# Patient Record
Sex: MALE | Race: WHITE | NOT HISPANIC OR LATINO | Employment: FULL TIME | URBAN - METROPOLITAN AREA
[De-identification: names, ages, dates, MRNs, and addresses within clinical notes are randomized per-mention and may not be internally consistent; named-entity substitution may affect disease eponyms.]

---

## 2018-11-17 PROBLEM — S52.531A CLOSED COLLES' FRACTURE OF RIGHT RADIUS: Status: ACTIVE | Noted: 2018-11-17

## 2020-03-11 ENCOUNTER — HOSPITAL ENCOUNTER (OUTPATIENT)
Dept: RADIOLOGY | Facility: HOSPITAL | Age: 55
Discharge: HOME | End: 2020-03-11
Attending: NURSE PRACTITIONER
Payer: COMMERCIAL

## 2020-03-11 ENCOUNTER — OFFICE VISIT (OUTPATIENT)
Dept: OTOLARYNGOLOGY | Facility: CLINIC | Age: 55
End: 2020-03-11
Payer: COMMERCIAL

## 2020-03-11 ENCOUNTER — TELEPHONE (OUTPATIENT)
Dept: OTOLARYNGOLOGY | Facility: CLINIC | Age: 55
End: 2020-03-11

## 2020-03-11 ENCOUNTER — CLINICAL SUPPORT (OUTPATIENT)
Dept: AUDIOLOGY | Facility: CLINIC | Age: 55
End: 2020-03-11
Payer: COMMERCIAL

## 2020-03-11 VITALS — HEIGHT: 76 IN | WEIGHT: 187.63 LBS | BODY MASS INDEX: 22.85 KG/M2

## 2020-03-11 DIAGNOSIS — H92.01 REFERRED OTALGIA OF RIGHT EAR: ICD-10-CM

## 2020-03-11 DIAGNOSIS — R09.81 NASAL CONGESTION: ICD-10-CM

## 2020-03-11 DIAGNOSIS — J34.89 SINUS PRESSURE: Primary | ICD-10-CM

## 2020-03-11 DIAGNOSIS — J34.89 SINUS PRESSURE: ICD-10-CM

## 2020-03-11 DIAGNOSIS — J30.9 ALLERGIC RHINITIS, UNSPECIFIED SEASONALITY, UNSPECIFIED TRIGGER: ICD-10-CM

## 2020-03-11 DIAGNOSIS — H92.01 OTALGIA OF RIGHT EAR: Primary | ICD-10-CM

## 2020-03-11 DIAGNOSIS — R05.3 PERSISTENT COUGH FOR 3 WEEKS OR LONGER: ICD-10-CM

## 2020-03-11 PROCEDURE — 99999 PR PBB SHADOW E&M-NEW PATIENT-LVL III: ICD-10-PCS | Mod: PBBFAC,,, | Performed by: NURSE PRACTITIONER

## 2020-03-11 PROCEDURE — 70220 X-RAY EXAM OF SINUSES: CPT | Mod: TC,FY,PO

## 2020-03-11 PROCEDURE — 99203 OFFICE O/P NEW LOW 30 MIN: CPT | Mod: S$GLB,,, | Performed by: NURSE PRACTITIONER

## 2020-03-11 PROCEDURE — 92567 PR TYMPA2METRY: ICD-10-PCS | Mod: S$GLB,,, | Performed by: AUDIOLOGIST

## 2020-03-11 PROCEDURE — 99203 PR OFFICE/OUTPT VISIT, NEW, LEVL III, 30-44 MIN: ICD-10-PCS | Mod: S$GLB,,, | Performed by: NURSE PRACTITIONER

## 2020-03-11 PROCEDURE — 70220 X-RAY EXAM OF SINUSES: CPT | Mod: 26,,, | Performed by: RADIOLOGY

## 2020-03-11 PROCEDURE — 3008F BODY MASS INDEX DOCD: CPT | Mod: CPTII,S$GLB,, | Performed by: NURSE PRACTITIONER

## 2020-03-11 PROCEDURE — 70220 XR SINUSES MIN 3 VIEWS: ICD-10-PCS | Mod: 26,,, | Performed by: RADIOLOGY

## 2020-03-11 PROCEDURE — 3008F PR BODY MASS INDEX (BMI) DOCUMENTED: ICD-10-PCS | Mod: CPTII,S$GLB,, | Performed by: NURSE PRACTITIONER

## 2020-03-11 PROCEDURE — 92567 TYMPANOMETRY: CPT | Mod: S$GLB,,, | Performed by: AUDIOLOGIST

## 2020-03-11 PROCEDURE — 99999 PR PBB SHADOW E&M-NEW PATIENT-LVL III: CPT | Mod: PBBFAC,,, | Performed by: NURSE PRACTITIONER

## 2020-03-11 RX ORDER — TRIAMCINOLONE ACETONIDE 55 UG/1
2 SPRAY, METERED NASAL DAILY
COMMUNITY
End: 2022-11-01

## 2020-03-11 RX ORDER — BENZONATATE 100 MG/1
CAPSULE ORAL
Status: ON HOLD | COMMUNITY
Start: 2020-01-12 | End: 2020-12-04

## 2020-03-11 RX ORDER — TIZANIDINE 4 MG/1
TABLET ORAL
Status: ON HOLD | COMMUNITY
Start: 2020-01-12 | End: 2020-12-04

## 2020-03-11 NOTE — TELEPHONE ENCOUNTER
----- Message from Ingrid Caruso NP sent at 3/11/2020  1:55 PM CDT -----  All your sinuses are clear.  No fluid or infection in any of your sinuses.    Recommend rinsing sinuses daily with saline rinse.   Recommend Flonase or Nasacort.   Recommend Zyrtec or Allegra.

## 2020-03-11 NOTE — PROGRESS NOTES
Subjective:       Patient ID: Aydin Desai is a 54 y.o. male.    Chief Complaint: Sinus Problem (congestion, PND)    HPI   Patient is new to ENT, self-referred for sinus problems. He developed viral URI 2 months ago; [place] [facility] and cough med. URI symptoms resolved but continues coughing. Nasal congestion upon laying down at night. Uses Nasacort. Feels like he has fluid in his right ear, mild otalgia.     Review of Systems   Constitutional: Negative.  Negative for fever.   HENT: Positive for congestion, postnasal drip, sinus pain and voice change. Negative for dental problem, facial swelling, rhinorrhea, sneezing, sore throat and trouble swallowing.         Frequent throat clearing   Eyes: Negative.  Negative for discharge, redness and itching.   Respiratory: Negative.  Negative for cough and choking.    Cardiovascular: Negative.    Gastrointestinal: Negative.    Musculoskeletal: Negative.    Skin: Negative.    Neurological: Negative.    Hematological: Negative.    Psychiatric/Behavioral: Negative.        Objective:      Physical Exam   Constitutional: He is oriented to person, place, and time. Vital signs are normal. He appears well-developed and well-nourished. He is cooperative. He does not appear ill. No distress.   HENT:   Head: Normocephalic and atraumatic.   Right Ear: Hearing, tympanic membrane, external ear and ear canal normal. Tympanic membrane is not erythematous. No middle ear effusion.   Left Ear: Hearing, tympanic membrane, external ear and ear canal normal. Tympanic membrane is not erythematous.  No middle ear effusion.   Nose: Nose normal. No mucosal edema or rhinorrhea. Right sinus exhibits no maxillary sinus tenderness and no frontal sinus tenderness. Left sinus exhibits no maxillary sinus tenderness and no frontal sinus tenderness.   Mouth/Throat: Uvula is midline, oropharynx is clear and moist and mucous membranes are normal. Mucous membranes are not  pale, not dry and not cyanotic. No oral lesions. No oropharyngeal exudate, posterior oropharyngeal edema or posterior oropharyngeal erythema.   Eyes: Pupils are equal, round, and reactive to light. Conjunctivae, EOM and lids are normal. Right eye exhibits no discharge. Left eye exhibits no discharge. No scleral icterus.   Neck: Trachea normal and normal range of motion. Neck supple. No tracheal deviation present. No thyroid mass and no thyromegaly present.   Cardiovascular: Normal rate.   Pulmonary/Chest: Effort normal. No stridor. No respiratory distress. He has no wheezes.   Musculoskeletal: Normal range of motion.   Lymphadenopathy:        Head (right side): No submental, no submandibular, no tonsillar, no preauricular and no posterior auricular adenopathy present.        Head (left side): No submental, no submandibular, no tonsillar, no preauricular and no posterior auricular adenopathy present.     He has no cervical adenopathy.        Right cervical: No superficial cervical and no posterior cervical adenopathy present.       Left cervical: No superficial cervical and no posterior cervical adenopathy present.   Neurological: He is alert and oriented to person, place, and time. He has normal strength. Coordination and gait normal.   Skin: Skin is warm, dry and intact. No lesion and no rash noted. He is not diaphoretic. No cyanosis. No pallor.   Psychiatric: He has a normal mood and affect. His speech is normal and behavior is normal. Judgment and thought content normal. Cognition and memory are normal.   Nursing note and vitals reviewed.      Assessment:     Nocturnal nasal congestion    Persistent dry cough  Allergic rhinitis  Plan:     Nasacort or Flonase QD-BID. Ponaris nasal emollient QHS. Zyrtec or Allegra daily.    Discussed typical constellation of symptoms seen with some of the more common differentials for chronic cough may include but not limited to: allergic rhinitis (see allergist or take daily  allergy meds), silent reflux (see GI or take daily reflux meds), sinusitis (imaging).   Sinus x-rays today -- will notify pt of results as soon as available.  NeilMed or neti pot rinsing daily.   Call back if symptoms worsen/persist

## 2020-03-11 NOTE — PATIENT INSTRUCTIONS
"DIFFERENT TYPES OF "ENT-APPROVED" NASAL SPRAYS:  · Flonase / fluticasone (steroid spray) is best for stuffy, pressure, fullness.  · Astelin / azelastine (antihistamine spray) is best for itchy, drippy, sneezy.  · Atrovent / ipratropium is best for chronic watery nasal drip ("leaky faucet" nose), which may worsen with eating.    Nasal spray instructions:  Blow nose first gently to clean. Keep chin level with the floor (do not tilt head forward or back). Insert nasal spray taking caution to direct it AWAY from the middle wall inside the nose (septum) to avoid irritating nasal septum which could cause nosebleed.  Do not tilt spray up but rather flat and out along the roof of your mouth to spray. Angle the tip of the spray out slightly toward the direction of the ears; then spray. Do not take quick vigorous sniff but rather slow gentle inhalation while waiting for medication to absorb into nasal passages. Then administer second spray in same way.     EUSTACHIAN TUBE INSTRUCTIONS:  Nasal valsalva:  Pinch nose and with closed mouth try to "pop" air into ears through the back of the nose. Attempt this several times a day. The more popping you have, the more likely the fluid will resolve.     Nasal saline rinse kit (use Neti pot or [employer] sinus rinse kit) -- use sterile water (boiled tap water which has cooled) or distilled bottled water. Add 1/4 teaspoon sea salt and a pinch of baking soda or a mixture packet from the maker of your sinus rinse kit.  Rinse through both sides of nose to cleanse sinus and nasal passages, bending forward with head tilted down. Keep your mouth open, without holding your breath. Squeeze bottle gently until solution starts draining from the opposite nasal passage. After bottle is empty, blow nose very gently, without pinching nose completely, to avoid pressure on eardrums.  There are useful YouTube videos that show demonstration of how to do these properly.     Ponaris Nasal Emollient is used " for the relief of: nasal congestion due to colds, nasal irritation, allergy exacerbations, nasal crusting. Specifically prepared iodized organic oils of pine, eucalyptus, peppermint, cajeput, and cottonseed. To order Ponaris: ask your pharmacist to order it for you or we carry it in our pharmacy downstairs on the first floor.

## 2020-12-04 PROBLEM — Z86.010 HISTORY OF COLON POLYPS: Status: ACTIVE | Noted: 2020-12-04

## 2020-12-04 PROBLEM — Z86.0100 HISTORY OF COLON POLYPS: Status: ACTIVE | Noted: 2020-12-04

## 2021-05-10 ENCOUNTER — PATIENT MESSAGE (OUTPATIENT)
Dept: RESEARCH | Facility: HOSPITAL | Age: 56
End: 2021-05-10

## 2021-06-15 PROBLEM — R10.13 ABDOMINAL PAIN, EPIGASTRIC: Status: ACTIVE | Noted: 2021-06-15

## 2022-11-01 ENCOUNTER — OFFICE VISIT (OUTPATIENT)
Dept: SURGERY | Facility: CLINIC | Age: 57
End: 2022-11-01
Payer: COMMERCIAL

## 2022-11-01 VITALS
BODY MASS INDEX: 23.2 KG/M2 | WEIGHT: 190.5 LBS | HEART RATE: 65 BPM | HEIGHT: 76 IN | TEMPERATURE: 99 F | SYSTOLIC BLOOD PRESSURE: 144 MMHG | DIASTOLIC BLOOD PRESSURE: 71 MMHG

## 2022-11-01 DIAGNOSIS — R10.32 LEFT LOWER QUADRANT ABDOMINAL PAIN: Primary | ICD-10-CM

## 2022-11-01 PROCEDURE — 3077F PR MOST RECENT SYSTOLIC BLOOD PRESSURE >= 140 MM HG: ICD-10-PCS | Mod: CPTII,S$GLB,, | Performed by: STUDENT IN AN ORGANIZED HEALTH CARE EDUCATION/TRAINING PROGRAM

## 2022-11-01 PROCEDURE — 3078F PR MOST RECENT DIASTOLIC BLOOD PRESSURE < 80 MM HG: ICD-10-PCS | Mod: CPTII,S$GLB,, | Performed by: STUDENT IN AN ORGANIZED HEALTH CARE EDUCATION/TRAINING PROGRAM

## 2022-11-01 PROCEDURE — 99203 OFFICE O/P NEW LOW 30 MIN: CPT | Mod: S$GLB,,, | Performed by: STUDENT IN AN ORGANIZED HEALTH CARE EDUCATION/TRAINING PROGRAM

## 2022-11-01 PROCEDURE — 3008F PR BODY MASS INDEX (BMI) DOCUMENTED: ICD-10-PCS | Mod: CPTII,S$GLB,, | Performed by: STUDENT IN AN ORGANIZED HEALTH CARE EDUCATION/TRAINING PROGRAM

## 2022-11-01 PROCEDURE — 3008F BODY MASS INDEX DOCD: CPT | Mod: CPTII,S$GLB,, | Performed by: STUDENT IN AN ORGANIZED HEALTH CARE EDUCATION/TRAINING PROGRAM

## 2022-11-01 PROCEDURE — 99203 PR OFFICE/OUTPT VISIT, NEW, LEVL III, 30-44 MIN: ICD-10-PCS | Mod: S$GLB,,, | Performed by: STUDENT IN AN ORGANIZED HEALTH CARE EDUCATION/TRAINING PROGRAM

## 2022-11-01 PROCEDURE — 99999 PR PBB SHADOW E&M-EST. PATIENT-LVL III: CPT | Mod: PBBFAC,,, | Performed by: STUDENT IN AN ORGANIZED HEALTH CARE EDUCATION/TRAINING PROGRAM

## 2022-11-01 PROCEDURE — 3078F DIAST BP <80 MM HG: CPT | Mod: CPTII,S$GLB,, | Performed by: STUDENT IN AN ORGANIZED HEALTH CARE EDUCATION/TRAINING PROGRAM

## 2022-11-01 PROCEDURE — 1159F MED LIST DOCD IN RCRD: CPT | Mod: CPTII,S$GLB,, | Performed by: STUDENT IN AN ORGANIZED HEALTH CARE EDUCATION/TRAINING PROGRAM

## 2022-11-01 PROCEDURE — 1159F PR MEDICATION LIST DOCUMENTED IN MEDICAL RECORD: ICD-10-PCS | Mod: CPTII,S$GLB,, | Performed by: STUDENT IN AN ORGANIZED HEALTH CARE EDUCATION/TRAINING PROGRAM

## 2022-11-01 PROCEDURE — 99999 PR PBB SHADOW E&M-EST. PATIENT-LVL III: ICD-10-PCS | Mod: PBBFAC,,, | Performed by: STUDENT IN AN ORGANIZED HEALTH CARE EDUCATION/TRAINING PROGRAM

## 2022-11-01 PROCEDURE — 3077F SYST BP >= 140 MM HG: CPT | Mod: CPTII,S$GLB,, | Performed by: STUDENT IN AN ORGANIZED HEALTH CARE EDUCATION/TRAINING PROGRAM

## 2022-11-01 RX ORDER — PROMETHAZINE HYDROCHLORIDE 12.5 MG/1
12.5 TABLET ORAL EVERY 4 HOURS PRN
COMMUNITY
Start: 2022-10-21 | End: 2022-11-01

## 2022-11-01 NOTE — PROGRESS NOTES
History & Physical    SUBJECTIVE:     History of Present Illness:  Patient is a 56 y.o. male presents with left lower quadrant abdominal pain.  Pain is worse when stretching.  Denies bulging of the abdominal wall or in the left groin.  This is been going on for years.  It is stable.  He had prior evaluation with an ultrasound and CT scan that did not show any significant abnormality reportedly.  These imaging studies are not available currently.    Chief Complaint   Patient presents with    Consult     [facility]       Review of patient's allergies indicates:  No Active Allergies    No current outpatient medications on file.     No current facility-administered medications for this visit.     Facility-Administered Medications Ordered in Other Visits   Medication Dose Route Frequency Provider Last Rate Last Admin    lactated ringers infusion   Intravenous Continuous Joey Klein MD 15 mL/hr at 06/15/21 1115 Restarted at 06/15/21 1237    sodium chloride 0.9% flush 10 mL  10 mL Intravenous PRN Joey Klein MD           Past Medical History:   Diagnosis Date    GERD (gastroesophageal reflux disease)     Hemorrhoids     History of colon polyps     Thrombocytopenia      Past Surgical History:   Procedure Laterality Date    COLONOSCOPY N/A 12/4/2020    Procedure: COLONOSCOPY;  Surgeon: Joey Klein MD;  Location: [facility];  Service: Endoscopy;  Laterality: N/A;    COLONOSCOPY W/ POLYPECTOMY      ESOPHAGOGASTRODUODENOSCOPY  06/15/2021    ESOPHAGOGASTRODUODENOSCOPY N/A 6/15/2021    Procedure: EGD (ESOPHAGOGASTRODUODENOSCOPY);  Surgeon: Joey Klein MD;  Location: [facility];  Service: Endoscopy;  Laterality: N/A;    KNEE SURGERY      OPEN REDUCTION AND INTERNAL FIXATION (ORIF) OF FRACTURE OF DISTAL RADIUS Right 11/17/2018    Procedure: ORIF, FRACTURE, RADIUS, DISTAL;  Surgeon: Cole Dumont MD;  Location: [facility];  Service: Orthopedics;  Laterality: Right;     No family history on file.  Social History     Tobacco Use     "Smoking status: Never   Substance Use Topics    Alcohol use: No    Drug use: Never        Review of Systems:  Review of Systems   Constitutional: Negative.  Negative for fatigue and fever.   HENT: Negative.     Eyes: Negative.    Respiratory: Negative.  Negative for shortness of breath.    Cardiovascular: Negative.  Negative for chest pain.   Gastrointestinal:  Positive for abdominal pain.   Endocrine: Negative.    Genitourinary: Negative.    Musculoskeletal: Negative.    Skin: Negative.    Allergic/Immunologic: Negative.    Neurological: Negative.    Hematological: Negative.    Psychiatric/Behavioral: Negative.       OBJECTIVE:     Vital Signs (Most Recent)  Temp: 98.7 °F (37.1 °C) (11/01/22 1020)  Pulse: 65 (11/01/22 1020)  BP: (!) 144/71 (11/01/22 1020)  6' 4" (1.93 m)  86.4 kg (190 lb 7.6 oz)     Physical Exam:  Physical Exam  Constitutional:       General: He is not in acute distress.     Appearance: Normal appearance. He is not ill-appearing, toxic-appearing or diaphoretic.   HENT:      Head: Normocephalic.      Nose: Nose normal.   Eyes:      Conjunctiva/sclera: Conjunctivae normal.   Cardiovascular:      Rate and Rhythm: Normal rate and regular rhythm.   Pulmonary:      Effort: Pulmonary effort is normal.   Abdominal:      Palpations: Abdomen is soft.   Genitourinary:     Comments: No evidence of inguinal hernia  Musculoskeletal:         General: Normal range of motion.      Cervical back: Normal range of motion.   Skin:     General: Skin is warm.   Neurological:      General: No focal deficit present.      Mental Status: He is alert.   Psychiatric:         Mood and Affect: Mood normal.         ASSESSMENT/PLAN:     56-year-old male with left lower quadrant abdominal pain.  There is no hernia my exam today.  Pain sounds more musculoskeletal in nature.    PLAN:  Discussed continuing observation versus repeat imaging with provocative ultrasound or CT imaging.  Patient wishes to observe for now.  He will call " the set up follow-up as needed or if symptoms change.

## 2023-02-06 ENCOUNTER — HOSPITAL ENCOUNTER (OUTPATIENT)
Dept: RADIOLOGY | Facility: HOSPITAL | Age: 58
Discharge: HOME | End: 2023-02-06
Attending: STUDENT IN AN ORGANIZED HEALTH CARE EDUCATION/TRAINING PROGRAM
Payer: COMMERCIAL

## 2023-02-06 ENCOUNTER — OFFICE VISIT (OUTPATIENT)
Dept: PODIATRY | Facility: CLINIC | Age: 58
End: 2023-02-06
Payer: COMMERCIAL

## 2023-02-06 DIAGNOSIS — M79.672 LEFT FOOT PAIN: Primary | ICD-10-CM

## 2023-02-06 DIAGNOSIS — M79.672 LEFT FOOT PAIN: ICD-10-CM

## 2023-02-06 PROCEDURE — 73630 X-RAY EXAM OF FOOT: CPT | Mod: 26,LT,, | Performed by: RADIOLOGY

## 2023-02-06 PROCEDURE — 1160F PR REVIEW ALL MEDS BY PRESCRIBER/CLIN PHARMACIST DOCUMENTED: ICD-10-PCS | Mod: CPTII,S$GLB,, | Performed by: STUDENT IN AN ORGANIZED HEALTH CARE EDUCATION/TRAINING PROGRAM

## 2023-02-06 PROCEDURE — 99999 PR PBB SHADOW E&M-EST. PATIENT-LVL II: ICD-10-PCS | Mod: PBBFAC,,, | Performed by: STUDENT IN AN ORGANIZED HEALTH CARE EDUCATION/TRAINING PROGRAM

## 2023-02-06 PROCEDURE — 73630 XR FOOT COMPLETE 3 VIEW LEFT: ICD-10-PCS | Mod: 26,LT,, | Performed by: RADIOLOGY

## 2023-02-06 PROCEDURE — 73630 X-RAY EXAM OF FOOT: CPT | Mod: TC,PO,LT

## 2023-02-06 PROCEDURE — 1159F PR MEDICATION LIST DOCUMENTED IN MEDICAL RECORD: ICD-10-PCS | Mod: CPTII,S$GLB,, | Performed by: STUDENT IN AN ORGANIZED HEALTH CARE EDUCATION/TRAINING PROGRAM

## 2023-02-06 PROCEDURE — 99999 PR PBB SHADOW E&M-EST. PATIENT-LVL II: CPT | Mod: PBBFAC,,, | Performed by: STUDENT IN AN ORGANIZED HEALTH CARE EDUCATION/TRAINING PROGRAM

## 2023-02-06 PROCEDURE — 99203 OFFICE O/P NEW LOW 30 MIN: CPT | Mod: S$GLB,,, | Performed by: STUDENT IN AN ORGANIZED HEALTH CARE EDUCATION/TRAINING PROGRAM

## 2023-02-06 PROCEDURE — 1159F MED LIST DOCD IN RCRD: CPT | Mod: CPTII,S$GLB,, | Performed by: STUDENT IN AN ORGANIZED HEALTH CARE EDUCATION/TRAINING PROGRAM

## 2023-02-06 PROCEDURE — 1160F RVW MEDS BY RX/DR IN RCRD: CPT | Mod: CPTII,S$GLB,, | Performed by: STUDENT IN AN ORGANIZED HEALTH CARE EDUCATION/TRAINING PROGRAM

## 2023-02-06 PROCEDURE — 99203 PR OFFICE/OUTPT VISIT, NEW, LEVL III, 30-44 MIN: ICD-10-PCS | Mod: S$GLB,,, | Performed by: STUDENT IN AN ORGANIZED HEALTH CARE EDUCATION/TRAINING PROGRAM

## 2023-02-06 NOTE — PROGRESS NOTES
Subjective:      Patient ID: Aydin Desai is a 57 y.o. male.    Chief Complaint: Foot Pain (Lft. Foot   pain is side of foot)    Left foot pain along the lateral aspect, especially the 5th met base for 4-6 weeks now. Started after playing basketball. No pain with walking but there is pain after activities.     Review of Systems   Musculoskeletal:         Left foot pain   All other systems reviewed and are negative.        Objective:      Physical Exam  Cardiovascular:      Pulses:           Dorsalis pedis pulses are 2+ on the left side.        Posterior tibial pulses are 2+ on the left side.   Musculoskeletal:        Feet:    Feet:      Left foot:      Skin integrity: Skin integrity normal.      Toenail Condition: Left toenails are normal.      Comments: Some minor edema at the 5th met base of the left foot with pain to palpation. There is no pain with provocation of the peroneal tendons.             Assessment:       Encounter Diagnosis   Name Primary?    Left foot pain Yes         Plan:       Aydin was seen today for foot pain.    Diagnoses and all orders for this visit:    Left foot pain  -     X-Ray Foot Complete Left; Future      I counseled the patient on his conditions, their implications and medical management.    Possible stress reaction vs 5th met base fracture vs insertional peroneal tendonitis.     Valgus wedge inserts and x-rays for evaluation. F/u 1 month or sooner pending x-ray results.    RICE therapy.     Rosalio Singh DPM

## 2024-09-16 PROBLEM — D69.6 THROMBOCYTOPENIA: Status: ACTIVE | Noted: 2024-09-16

## 2024-09-16 PROBLEM — R10.13 ABDOMINAL PAIN, EPIGASTRIC: Status: RESOLVED | Noted: 2021-06-15 | Resolved: 2024-09-16

## 2024-09-16 PROBLEM — S52.531A CLOSED COLLES' FRACTURE OF RIGHT RADIUS: Status: RESOLVED | Noted: 2018-11-17 | Resolved: 2024-09-16

## 2024-09-16 PROBLEM — R01.1 HEART MURMUR: Status: ACTIVE | Noted: 2024-09-16

## 2024-09-16 PROBLEM — I34.0 NONRHEUMATIC MITRAL VALVE REGURGITATION: Status: ACTIVE | Noted: 2024-09-16

## 2024-09-16 PROBLEM — Z00.01 ABNORMAL WELLNESS EXAM: Status: ACTIVE | Noted: 2024-09-16

## 2024-09-16 PROBLEM — Z86.0100 HISTORY OF COLON POLYPS: Status: RESOLVED | Noted: 2020-12-04 | Resolved: 2024-09-16

## 2024-10-01 PROBLEM — K21.9 GASTROESOPHAGEAL REFLUX DISEASE WITHOUT ESOPHAGITIS: Status: ACTIVE | Noted: 2024-10-01

## 2024-10-01 PROBLEM — N40.1 BENIGN PROSTATIC HYPERPLASIA WITH URINARY HESITANCY: Status: ACTIVE | Noted: 2024-10-01

## 2024-10-01 PROBLEM — R39.11 BENIGN PROSTATIC HYPERPLASIA WITH URINARY HESITANCY: Status: ACTIVE | Noted: 2024-10-01

## 2024-11-12 ENCOUNTER — OFFICE VISIT (OUTPATIENT)
Dept: GASTROENTEROLOGY | Facility: CLINIC | Age: 59
End: 2024-11-12
Payer: COMMERCIAL

## 2024-11-12 VITALS — HEIGHT: 76 IN | BODY MASS INDEX: 22.01 KG/M2 | WEIGHT: 180.75 LBS

## 2024-11-12 DIAGNOSIS — K21.9 GASTROESOPHAGEAL REFLUX DISEASE, UNSPECIFIED WHETHER ESOPHAGITIS PRESENT: ICD-10-CM

## 2024-11-12 DIAGNOSIS — R49.0 HOARSE VOICE QUALITY: ICD-10-CM

## 2024-11-12 DIAGNOSIS — Z12.11 SCREENING FOR COLON CANCER: ICD-10-CM

## 2024-11-12 DIAGNOSIS — R09.A2 GLOBUS SENSATION: Primary | ICD-10-CM

## 2024-11-12 PROCEDURE — 99999 PR PBB SHADOW E&M-EST. PATIENT-LVL III: CPT | Mod: PBBFAC,,,

## 2024-11-12 PROCEDURE — 1159F MED LIST DOCD IN RCRD: CPT | Mod: CPTII,S$GLB,,

## 2024-11-12 PROCEDURE — 1160F RVW MEDS BY RX/DR IN RCRD: CPT | Mod: CPTII,S$GLB,,

## 2024-11-12 PROCEDURE — 3008F BODY MASS INDEX DOCD: CPT | Mod: CPTII,S$GLB,,

## 2024-11-12 PROCEDURE — 99203 OFFICE O/P NEW LOW 30 MIN: CPT | Mod: S$GLB,,,

## 2024-11-12 RX ORDER — FAMOTIDINE 20 MG/1
20 TABLET, FILM COATED ORAL 2 TIMES DAILY
Qty: 180 TABLET | Refills: 0 | Status: SHIPPED | OUTPATIENT
Start: 2024-11-12 | End: 2025-02-10

## 2024-11-12 NOTE — PROGRESS NOTES
"Subjective:       Patient ID: Aydin Desai is a 58 y.o. male Body mass index is 22 kg/m².    Chief Complaint: Gastroesophageal Reflux    This patient is new to me.  Established patient of Dr. Klein.     Gastroesophageal Reflux  He complains of globus sensation (CHEIF COMPLAINT:  Frequent throat clearing the lasts 1-2 months; occurs after eating and feels as though he has phlegm at the back of his throat) and a hoarse voice (Chronic). He reports no abdominal pain (Denies pain; reports past history of stomach pain after taking NSAIDs that has resolved), no belching, no chest pain, no choking, no coughing, no dysphagia, no early satiety, no heartburn, no nausea, no sore throat or no water brash. This is a new problem. The current episode started more than 1 month ago. The problem occurs frequently. The problem has been unchanged. The symptoms are aggravated by stress. Pertinent negatives include no anemia, fatigue, melena, muscle weakness or weight loss. He has tried a PPI (Reports taking 2-3 days of pantoprazole, but discontinued because it made him feel weird" past tx: pepcid and zantac (both effective)) for the symptoms. Past procedures include an EGD (06/15/21  - Patulous lower esophageal sphincter. Biopsied. Normal stomach. Biopsied. Normal examined duodenum; patho: benign, no bacteria). Past procedures do not include an abdominal ultrasound, esophageal manometry, esophageal pH monitoring, H. pylori antibody titer or a UGI. Past invasive treatments do not include gastroplasty, gastroplication or reflux surgery.     Review of Systems   Constitutional:  Negative for activity change, appetite change, chills, diaphoresis, fatigue, fever, unexpected weight change and weight loss.   HENT:  Positive for hoarse voice (Chronic), postnasal drip and voice change. Negative for sore throat and trouble swallowing.    Respiratory:  Negative for cough, choking and shortness of breath.    Cardiovascular:  Negative for chest " pain.   Gastrointestinal:  Negative for abdominal distention, abdominal pain (Denies pain; reports past history of stomach pain after taking NSAIDs that has resolved), anal bleeding, blood in stool, constipation (Typically has daily BMs rated 4 on Deuel scale), diarrhea, dysphagia, heartburn, melena, nausea, rectal pain and vomiting.   Musculoskeletal:  Negative for muscle weakness.       No LMP for male patient.  Past Medical History:   Diagnosis Date    GERD (gastroesophageal reflux disease)     Hemorrhoids     History of colon polyps     Thrombocytopenia      Past Surgical History:   Procedure Laterality Date    COLONOSCOPY N/A 12/04/2020    Procedure: COLONOSCOPY;  Surgeon: Joey Klein MD;  Location: [facility];  Service: Endoscopy;  Laterality: N/A;    COLONOSCOPY W/ POLYPECTOMY      ESOPHAGOGASTRODUODENOSCOPY  06/15/2021    ESOPHAGOGASTRODUODENOSCOPY N/A 06/15/2021    Procedure: EGD (ESOPHAGOGASTRODUODENOSCOPY);  Surgeon: Joey Klein MD;  Location: [facility];  Service: Endoscopy;  Laterality: N/A;    KNEE SURGERY      OPEN REDUCTION AND INTERNAL FIXATION (ORIF) OF FRACTURE OF DISTAL RADIUS Right 11/17/2018    Procedure: ORIF, FRACTURE, RADIUS, DISTAL;  Surgeon: Cole Dumont MD;  Location: [facility];  Service: Orthopedics;  Laterality: Right;    UPPER GASTROINTESTINAL ENDOSCOPY       Family History   Problem Relation Name Age of Onset    Throat cancer Father      Colon cancer Neg Hx      Crohn's disease Neg Hx      Ulcerative colitis Neg Hx      Stomach cancer Neg Hx      Esophageal cancer Neg Hx       Social History     Tobacco Use    Smoking status: Never   Substance Use Topics    Alcohol use: No    Drug use: Never     Wt Readings from Last 10 Encounters:   11/12/24 82 kg (180 lb 12.4 oz)   10/01/24 83 kg (183 lb)   09/08/23 85.7 kg (189 lb)   07/11/23 85.7 kg (189 lb)   11/01/22 86.4 kg (190 lb 7.6 oz)   06/15/21 84.7 kg (186 lb 11.7 oz)   12/04/20 84.8 kg (187 lb 1 oz)   03/11/20 85.1 kg (187 lb 9.8  oz)   07/21/19 91 kg (200 lb 9.9 oz)   11/17/18 86.2 kg (190 lb)     Lab Results   Component Value Date    WBC 4.80 12/04/2020    HGB 16.2 12/04/2020    HCT 46.8 12/04/2020    MCV 92 12/04/2020     12/04/2020     Reviewed prior medical records including office visit with Dr. Kent 10/01/2024 & endoscopy history (see surgical history).    Objective:      Physical Exam  Vitals and nursing note reviewed.   Constitutional:       General: He is not in acute distress.     Appearance: Normal appearance. He is normal weight. He is not ill-appearing.   HENT:      Mouth/Throat:      Lips: Pink. No lesions.   Cardiovascular:      Rate and Rhythm: Normal rate and regular rhythm.      Heart sounds: Normal heart sounds.   Pulmonary:      Effort: Pulmonary effort is normal. No respiratory distress.      Breath sounds: Normal breath sounds.   Abdominal:      General: Abdomen is flat. Bowel sounds are normal. There is no distension or abdominal bruit. There are no signs of injury.      Palpations: Abdomen is soft. There is no shifting dullness, fluid wave, hepatomegaly, splenomegaly or mass.      Tenderness: There is no abdominal tenderness. There is no guarding or rebound. Negative signs include Bardales's sign, Rovsing's sign and McBurney's sign.   Skin:     General: Skin is warm and dry.      Coloration: Skin is not jaundiced or pale.   Neurological:      Mental Status: He is alert and oriented to person, place, and time.   Psychiatric:         Attention and Perception: Attention normal.         Mood and Affect: Mood normal.         Speech: Speech normal.         Behavior: Behavior normal.         Assessment:       1. Globus sensation    2. Hoarse voice quality    3. Gastroesophageal reflux disease, unspecified whether esophagitis present    4. Screening for colon cancer        Plan:       Globus sensation & Hoarse voice quality  - schedule EGD, discussed procedure with patient, including risks and benefits, patient  verbalized understanding  - consider referral to ENT  -START: famotidine (PEPCID) 20 MG tablet; Take 1 tablet (20 mg total) by mouth 2 (two) times daily.  Dispense: 180 tablet; Refill: 0  - discontinue pantoprazole due to side effects; consider alternative PPI in the future pending EGD    Gastroesophageal reflux disease, unspecified whether esophagitis present  - schedule EGD, discussed procedure with patient, including risks and benefits, patient verbalized understanding  -Avoid large meals, avoid eating within 2-3 hours of bedtime (avoid late night eating & lying down soon after eating), elevate head of bed if nocturnal symptoms are present, smoking cessation (if current smoker), & weight loss (if overweight).   -Avoid known foods which trigger reflux symptoms & to minimize/avoid high-fat foods, chocolate, caffeine, citrus, alcohol, & tomato products.  -Avoid/limit use of NSAID's, since they can cause GI upset, bleeding, and/or ulcers. If needed, take with food.  - START:  famotidine (PEPCID) 20 MG tablet; Take 1 tablet (20 mg total) by mouth 2 (two) times daily.  Dispense: 180 tablet; Refill: 0  - discontinue pantoprazole due to side effects; consider alternative PPI in the future pending EGD    Screening for colon cancer  - follow-up for surveillance colonoscopy 12/2025    Follow up in about 4 weeks (around 12/10/2024), or if symptoms worsen or fail to improve.      If no improvement in symptoms or symptoms worsen, call/follow-up at clinic or go to ER.        Total time spent on the encounter includes face to face time and non-face to face time preparing to see the patient (eg, review of tests), Obtaining and/or reviewing separately obtained history, Documenting clinical information in the electronic or other health record, Independently interpreting results (not separately reported) and communicating results to the patient/family/caregiver, or Care coordination (not separately reported).     A dictation  software program was used for this note. Please expect some simple typographical  errors in this note.

## 2024-11-22 ENCOUNTER — PATIENT MESSAGE (OUTPATIENT)
Dept: PODIATRY | Facility: CLINIC | Age: 59
End: 2024-11-22
Payer: COMMERCIAL

## 2024-12-05 ENCOUNTER — PATIENT MESSAGE (OUTPATIENT)
Dept: GASTROENTEROLOGY | Facility: CLINIC | Age: 59
End: 2024-12-05
Payer: COMMERCIAL

## 2024-12-13 ENCOUNTER — PATIENT MESSAGE (OUTPATIENT)
Dept: GASTROENTEROLOGY | Facility: CLINIC | Age: 59
End: 2024-12-13
Payer: COMMERCIAL

## 2025-01-03 ENCOUNTER — PATIENT MESSAGE (OUTPATIENT)
Dept: GASTROENTEROLOGY | Facility: CLINIC | Age: 60
End: 2025-01-03
Payer: COMMERCIAL

## 2025-01-03 RX ORDER — PANTOPRAZOLE SODIUM 40 MG/1
40 TABLET, DELAYED RELEASE ORAL DAILY
COMMUNITY

## 2025-01-03 NOTE — TELEPHONE ENCOUNTER
Please let the patient know I am glad he is doing well. He can continue Protonix 40 mg once daily and Pepcid 20 mg b.I.d. EGD is still recommended, but if patient would like to cancel and reschedule if symptoms return/worsen he can.  Sincerely,  Myesha Ruiz NP

## 2025-02-11 ENCOUNTER — TELEPHONE (OUTPATIENT)
Dept: GASTROENTEROLOGY | Facility: CLINIC | Age: 60
End: 2025-02-11
Payer: COMMERCIAL

## 2025-02-11 NOTE — TELEPHONE ENCOUNTER
----- Message from Susy sent at 2/11/2025  9:32 AM CST -----  Type: Needs Medical Advice  Who Called:  pt     Best Call Back Number: [phone] (home)     Additional Information: pt requesting call back in regards to wanting office to know that he wont be able to arrive until about 9. Am and that if his procedure was to be before that time he needs to reschedule something after 9 am please advise

## 2025-02-11 NOTE — TELEPHONE ENCOUNTER
Advised patient that I placed a note on his procedure that he needs to have an arrival time after 9 am.

## 2025-02-24 ENCOUNTER — RESULTS FOLLOW-UP (OUTPATIENT)
Dept: GASTROENTEROLOGY | Facility: CLINIC | Age: 60
End: 2025-02-24

## 2025-02-24 NOTE — PROGRESS NOTES
Please notify patient that biopsies reviewed and showed no bacteria.  Continue current meds and follow up as previously planned.